# Patient Record
Sex: FEMALE | Race: BLACK OR AFRICAN AMERICAN | Employment: PART TIME | URBAN - NONMETROPOLITAN AREA
[De-identification: names, ages, dates, MRNs, and addresses within clinical notes are randomized per-mention and may not be internally consistent; named-entity substitution may affect disease eponyms.]

---
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
## 2019-11-27 NOTE — TELEPHONE ENCOUNTER
FUTURE VISIT INFORMATION      FUTURE VISIT INFORMATION:    Date: 12/10/19    Time: 11 AM    Location: [facility]  REFERRAL INFORMATION:    Referring provider:  Self-Referred    Referring providers clinic:  NA    Reason for visit/diagnosis  Left Cauliflower Ear    RECORDS REQUESTED FROM:       Clinic name Comments Records Status Imaging Status   [facility] 4/28/17 - OV with Dr. Good (consult 2/8/17)  4/7/17 - OP Note EXCISE EXTENSIVE BILATERAL EARLOBE KELOIDS with Dr. Good   Care Everywhere

## 2019-12-10 ENCOUNTER — OFFICE VISIT (OUTPATIENT)
Dept: OTOLARYNGOLOGY | Facility: CLINIC | Age: 19
End: 2019-12-10
Payer: COMMERCIAL

## 2019-12-10 ENCOUNTER — PRE VISIT (OUTPATIENT)
Dept: OTOLARYNGOLOGY | Facility: CLINIC | Age: 19
End: 2019-12-10

## 2019-12-10 VITALS
BODY MASS INDEX: 40.04 KG/M2 | SYSTOLIC BLOOD PRESSURE: 113 MMHG | WEIGHT: 226 LBS | DIASTOLIC BLOOD PRESSURE: 80 MMHG | HEART RATE: 78 BPM | HEIGHT: 63 IN | RESPIRATION RATE: 16 BRPM

## 2019-12-10 DIAGNOSIS — L91.0 KELOID SCAR: Primary | ICD-10-CM

## 2019-12-10 PROBLEM — F41.8 DEPRESSION WITH ANXIETY: Status: ACTIVE | Noted: 2019-12-10

## 2019-12-10 RX ORDER — TRIAMCINOLONE ACETONIDE 40 MG/ML
40 INJECTION, SUSPENSION INTRA-ARTICULAR; INTRAMUSCULAR ONCE
Status: COMPLETED | OUTPATIENT
Start: 2019-12-10 | End: 2019-12-10

## 2019-12-10 RX ADMIN — TRIAMCINOLONE ACETONIDE 40 MG: 40 INJECTION, SUSPENSION INTRA-ARTICULAR; INTRAMUSCULAR at 11:11

## 2019-12-10 ASSESSMENT — MIFFLIN-ST. JEOR: SCORE: 1757.87

## 2019-12-10 ASSESSMENT — PAIN SCALES - GENERAL: PAINLEVEL: MODERATE PAIN (4)

## 2019-12-10 NOTE — NURSING NOTE
"Chief Complaint   Patient presents with     Consult     left external and internal ear pain      Blood pressure 113/80, pulse 78, resp. rate 16, height 1.59 m (5' 2.6\"), weight 102.5 kg (226 lb).    Agus Shirley LPN    "

## 2019-12-10 NOTE — PATIENT INSTRUCTIONS
Charlotte Estrada,    It was a pleasure to see you today.    1. You were seen in the ENT Clinic today by Shahida Tello PA-C.  If you have any questions or concerns after your appointment, please call   - Option 1: ENT Clinic: [phone]      2. I did an injection in the right ear lobe keloid today.  Dr. Guerra will see if this has helped shrink the keloid.    3.  Please see Dr. Guerra for the left keloid to discuss surgery to remove it.        Thank you,  Shahida Tello PA-C  Otolaryngology  Head & Neck Surgery  [phone]

## 2019-12-10 NOTE — PROGRESS NOTES
Invasive Procedure Safety Checklist  Procedure:  Kenalog injection to right ear lobe keloid    Responsible person(s):  Complete sections as appropriate and electronically sign and date below.    Staff/Provider  Consent documentation on chart:  YES  H&P is not applicable (when straight local anesthesia is used).    Procedure Team  Completed by comparing informed consent documentation, information on the patient record and/or the marked surgical site, and discussion with the patient/guardian.     Verified:  (Select all that apply)  Patient identification (two indicators)  Procedure to be performed  Procedure site and /or laterality and/or level  Consent  Procedure site:  Site marking not requred.  Provider Singleton - Site/Laterality/Level:  Right  Staff/Provider:  No images    Procedure Team:  *Pause for the Cause* verbal and active participation of team members- verify:  Patient name:  YES  Procedure to be performed:  YES  Site, laterality and level, noting patient position:  YES    Above steps completed as applicable (Electronic Signature, Title, Date):    Shahida Tello PA-C, 12/10/2019, 11:08 AM        Note:  Any incidents of wrong patient, wrong procedure, or wrong site are reported using the Occurrence Process already in place.  The occurrence form is required to be completed immediately with this type of event.

## 2019-12-10 NOTE — LETTER
12/10/2019       RE: Charlotte Estrada  [address]  [place] [address]     Dear Colleague,    Thank you for referring your patient, Charlotte Estrada, to the [facility] EAR NOSE AND THROAT at [facility]. Please see a copy of my visit note below.    [facility] New Patient Visit Note        Otolaryngology        December 10, 2019    Referring Provider:  Self         HPI:  Charlotte Estrada is a 19 year old female who presents for evaluation of bilateral earlobe keloids.    The patient reports that she developed keloids several years ago, after having her ears pierced.  She finally went in and saw plastic surgeon at [facility], Dr. David Good, in 2017 for this.  On April 7, 2017 he did bilateral earlobe excisions for keloids.  The patient was supposed to follow-up for Kenalog injections, however, she says she was in foster care at that time and was never taken back in for those injections.      She states that she had about 4 months where the keloids were gone and then they slowly started to grow back.  The right 1 is very small but it is getting larger and the left one is quite big.  This 1 is somewhat uncomfortable at times.  She also noticed that intermittently she is feeling some left ear ringing and some 1 to 2-second episodes of sharp pain.  She has not had any drainage from either ear.  There is no facial numbness or tingling.  No hearing changes.  No other masses on the neck.    ROS:  10 point review of systems completed and is negative except for those listed above    PMH:   Past Medical History:   Diagnosis Date     Nonpsychotic mental disorder     2014,SI       PSH:   Past Surgical History:   Procedure Laterality Date     keloid removal bilateral ear lobes  04/07/2017    [facility], Dr. David Good     OTHER SURGICAL HISTORY      [id number],NO PREVIOUS SURGERY       FamH: No family history on  "file.    SocH:   Social History     Tobacco Use     Smoking status: Current Some Day Smoker     Packs/day: 0.00     Years: 0.00     Pack years: 0.00     Types: Cigarettes, Other     Start date: 2016     Last attempt to quit: 2018     Years since quittin.5     Smokeless tobacco: Current User   Substance Use Topics     Alcohol use: Never     Drug use: Never     Types: Other     Comment: Drug use: No   patient does vape 1-2 times a month      Medications:   Current Outpatient Medications   Medication     FLUoxetine (PROZAC) 40 MG capsule     No current facility-administered medications for this visit.        Allergies:     Allergies   Allergen Reactions     Bees          Physical Exam:   /80   Pulse 78   Resp 16   Ht 1.59 m (5' 2.6\")   Wt 102.5 kg (226 lb)   BMI 40.55 kg/m       Constitutional: The patient was unaccompanied, well-groomed, and in no acute distress.    Head: Normocephalic and atraumatic.   Eyes: Pupils were equal and reactive.  Extraocular movement intact.    Ears: Pinnae and tragus non-tender.  Otologic microscopic ear exam:  Right: EAC clear, TM clear.  No evidence of retraction, effusion, perforation or cholesteatoma.  5 mm x 7 mm keloid in the posterior ear lobe  Left: EAC clear, TM clear.  No evidence of retraction, effusion, perforation or cholesteatoma. 2 cm x 2 cm keloid in the posterior ear lobe  Neck: No lymphadenopathy in the neck.  No palpable thyroid.  Normal range of motion  Skin: Normal:  warm and pink without rash, see description of keloids above  Neurologic: Alert and oriented x 3.  CN's III-XII within normal limits.  Voice normal.   Psychiatric: The patient's affect was calm, cooperative, and appropriate.          Media Information      Document Information     Photograph   Right ear lobe Keloid   12/10/2019 10:41 AM   Attached To:   Office Visit on 12/10/19 with Shahida Tello PA-C   Source Information     Shahida Tello PA-C   [facility] "             Media Information       Document Information     Photograph   Left ear lobe keloid   12/10/2019 10:42 AM   Attached To:   Office Visit on 12/10/19 with Shahida Tello PA-C   Source Information     Shahida Tello PA-C   [facility]             PROCEDURE:   KENALOG STEROID INJECTION: Informed consent was obtained. The keloid in the right ear lobe was injected with Kenalog 40 mg/mL. A 25G needle was used to inject approximately 0.5 ml of the Kenalog 40 mg/mL into the keloid. Patient tolerated procedure well and only mentioned mild amount of pain during injection. Bleeding from injection site minimal and direct pressure was applied for hemostasis.        Assessment/Plan:     1. Keloid to bilateral ear lobes, L>R.  Patient with bilateral keloids in her earlobes.  Today, we discussed that the left one will likely need to be surgically excised and I do not think that Kenalog injections will be helpful given its size.  I will refer her to Dr. Rivera Spence to discuss surgical excision.  I also explained that Dr. Rivera Spence would likely recommend some follow-up Kenalog injections that we would want her to make sure she keeps these appointments for.  I do not think that piercing her ear in the future would be a good idea as she will likely have the same problems.  She is also very likely to have the keloids recur despite surgical excision and Kenalog injections.  She still wants to proceed with meeting with the surgeon.    For the right keloid, we did go ahead and do a Kenalog injection today.  Please see procedure note above.  She will still follow-up with Dr. Rivera Spence to discuss excision and repeat evaluation for that.        Shahida Tello PA-C  Otolaryngology  Head & Neck Surgery  [phone]      CC:  Mica Guerra MD  [facility]        David Good MD  [facility]      Invasive Procedure Safety Checklist  Procedure:  Kenalog injection to right ear  lobe keloid    Responsible person(s):  Complete sections as appropriate and electronically sign and date below.    Staff/Provider  Consent documentation on chart:  YES  H&P is not applicable (when straight local anesthesia is used).    Procedure Team  Completed by comparing informed consent documentation, information on the patient record and/or the marked surgical site, and discussion with the patient/guardian.     Verified:  (Select all that apply)  Patient identification (two indicators)  Procedure to be performed  Procedure site and /or laterality and/or level  Consent  Procedure site:  Site marking not requred.  Provider Singleton - Site/Laterality/Level:  Right  Staff/Provider:  No images    Procedure Team:  *Pause for the Cause* verbal and active participation of team members- verify:  Patient name:  YES  Procedure to be performed:  YES  Site, laterality and level, noting patient position:  YES    Above steps completed as applicable (Electronic Signature, Title, Date):    Shahida Tello PA-C, 12/10/2019, 11:08 AM        Note:  Any incidents of wrong patient, wrong procedure, or wrong site are reported using the Occurrence Process already in place.  The occurrence form is required to be completed immediately with this type of event.      Again, thank you for allowing me to participate in the care of your patient.      Sincerely,    Shahida Tello PA-C

## 2019-12-10 NOTE — PROGRESS NOTES
[facility] New Patient Visit Note        Otolaryngology        December 10, 2019    Referring Provider:  Self         HPI:  Charlotte Estrada is a 19 year old female who presents for evaluation of bilateral earlobe keloids.    The patient reports that she developed keloids several years ago, after having her ears pierced.  She finally went in and saw plastic surgeon at [facility] clinic, Dr. David Good, in  for this.  On 2017 he did bilateral earlobe excisions for keloids.  The patient was supposed to follow-up for Kenalog injections, however, she says she was in foster care at that time and was never taken back in for those injections.      She states that she had about 4 months where the keloids were gone and then they slowly started to grow back.  The right 1 is very small but it is getting larger and the left one is quite big.  This 1 is somewhat uncomfortable at times.  She also noticed that intermittently she is feeling some left ear ringing and some 1 to 2-second episodes of sharp pain.  She has not had any drainage from either ear.  There is no facial numbness or tingling.  No hearing changes.  No other masses on the neck.    ROS:  10 point review of systems completed and is negative except for those listed above    PMH:   Past Medical History:   Diagnosis Date     Nonpsychotic mental disorder     ,       PSH:   Past Surgical History:   Procedure Laterality Date     keloid removal bilateral ear lobes  2017    [facility], Dr. David Good     OTHER SURGICAL HISTORY      [id number],NO PREVIOUS SURGERY       FamH: No family history on file.    SocH:   Social History     Tobacco Use     Smoking status: Current Some Day Smoker     Packs/day: 0.00     Years: 0.00     Pack years: 0.00     Types: Cigarettes, Other     Start date: 2016     Last attempt to quit: 2018     Years since quittin.5     Smokeless tobacco: Current  "User   Substance Use Topics     Alcohol use: Never     Drug use: Never     Types: Other     Comment: Drug use: No   patient does vape 1-2 times a month      Medications:   Current Outpatient Medications   Medication     FLUoxetine (PROZAC) 40 MG capsule     No current facility-administered medications for this visit.        Allergies:     Allergies   Allergen Reactions     Bees          Physical Exam:   /80   Pulse 78   Resp 16   Ht 1.59 m (5' 2.6\")   Wt 102.5 kg (226 lb)   BMI 40.55 kg/m      Constitutional: The patient was unaccompanied, well-groomed, and in no acute distress.    Head: Normocephalic and atraumatic.   Eyes: Pupils were equal and reactive.  Extraocular movement intact.    Ears: Pinnae and tragus non-tender.  Otologic microscopic ear exam:  Right: EAC clear, TM clear.  No evidence of retraction, effusion, perforation or cholesteatoma.  5 mm x 7 mm keloid in the posterior ear lobe  Left: EAC clear, TM clear.  No evidence of retraction, effusion, perforation or cholesteatoma. 2 cm x 2 cm keloid in the posterior ear lobe  Neck: No lymphadenopathy in the neck.  No palpable thyroid.  Normal range of motion  Skin: Normal:  warm and pink without rash, see description of keloids above  Neurologic: Alert and oriented x 3.  CN's III-XII within normal limits.  Voice normal.   Psychiatric: The patient's affect was calm, cooperative, and appropriate.          Media Information      Document Information     Photograph   Right ear lobe Keloid   12/10/2019 10:41 AM   Attached To:   Office Visit on 12/10/19 with Shahida Tello PA-C   Source Information     Shahida Tello PA-C   [facility]             Media Information       Document Information     Photograph   Left ear lobe keloid   12/10/2019 10:42 AM   Attached To:   Office Visit on 12/10/19 with Shahida Tello PA-C   Source Information     Shahida Tello PA-C [facility]             PROCEDURE:   KENALOG STEROID INJECTION: " Informed consent was obtained. The keloid in the right ear lobe was injected with Kenalog 40 mg/mL. A 25G needle was used to inject approximately 0.5 ml of the Kenalog 40 mg/mL into the keloid. Patient tolerated procedure well and only mentioned mild amount of pain during injection. Bleeding from injection site minimal and direct pressure was applied for hemostasis.        Assessment/Plan:     1. Keloid to bilateral ear lobes, L>R.  Patient with bilateral keloids in her earlobes.  Today, we discussed that the left one will likely need to be surgically excised and I do not think that Kenalog injections will be helpful given its size.  I will refer her to Dr. Rivera Spence to discuss surgical excision.  I also explained that Dr. Rivera Spence would likely recommend some follow-up Kenalog injections that we would want her to make sure she keeps these appointments for.  I do not think that piercing her ear in the future would be a good idea as she will likely have the same problems.  She is also very likely to have the keloids recur despite surgical excision and Kenalog injections.  She still wants to proceed with meeting with the surgeon.    For the right keloid, we did go ahead and do a Kenalog injection today.  Please see procedure note above.  She will still follow-up with Dr. Rivera Spence to discuss excision and repeat evaluation for that.        Shahida Tello PA-C  Otolaryngology  Head & Neck Surgery  [phone]      CC:  Mica Guerra MD  [facility] [address]      David Good MD  [facility]

## 2020-01-22 ENCOUNTER — OFFICE VISIT (OUTPATIENT)
Dept: OTOLARYNGOLOGY | Facility: CLINIC | Age: 20
End: 2020-01-22
Payer: COMMERCIAL

## 2020-01-22 VITALS — BODY MASS INDEX: 39.87 KG/M2 | WEIGHT: 225 LBS | HEIGHT: 63 IN

## 2020-01-22 DIAGNOSIS — L91.0 KELOID SCAR: Primary | ICD-10-CM

## 2020-01-22 RX ORDER — CEFAZOLIN SODIUM 1 G/50ML
1 INJECTION, SOLUTION INTRAVENOUS SEE ADMIN INSTRUCTIONS
Status: CANCELLED | OUTPATIENT
Start: 2020-01-22

## 2020-01-22 RX ORDER — CEFAZOLIN SODIUM 2 G/50ML
2 SOLUTION INTRAVENOUS
Status: CANCELLED | OUTPATIENT
Start: 2020-01-22

## 2020-01-22 ASSESSMENT — PAIN SCALES - GENERAL: PAINLEVEL: MILD PAIN (2)

## 2020-01-22 ASSESSMENT — MIFFLIN-ST. JEOR: SCORE: 1751.78

## 2020-01-22 NOTE — TELEPHONE ENCOUNTER
FUTURE VISIT INFORMATION      SURGERY INFORMATION:    Date: 2/6/20    Location: [facility] OR    Surgeon:  Mica Guerra    Anesthesia Type:  MAC with Block    Procedure: EXCISION, LESION, EXTERNAL EAR    Consult: OV 1/22/20 Rivera [facility]- ent    RECORDS REQUESTED FROM:       Primary Care Provider: Mohinder

## 2020-01-22 NOTE — PROGRESS NOTES
.  Facial Plastic and Reconstructive Surgery Consultation    Referring Provider:  Shahida BRIGHT    HPI:   I had the pleasure of seeing Charlotte Estrada today in clinic for consultation for external ear hypertrophic scar.    Charlotte Estrada is a 19 year old female with a history of hypertrophic scar formation in bilateral external ears after piercing.  She had right external ear hypertrophic scar excised in the past however had recurrence due to the fact that she did not follow-up with steroid injections.  She notes she then returned to having more treatment and feels like that improved however on the left lobule she continues to have a very large keloid that is quite bothersome.    She would like to have these addressed.        Review Of Systems  ROS: 10 point ROS neg other than the symptoms noted above in the HPI.    Patient Active Problem List   Diagnosis     Depression with anxiety     Past Surgical History:   Procedure Laterality Date     keloid removal bilateral ear lobes  04/07/2017    [facility], Dr. David Good     OTHER SURGICAL HISTORY      [id number],NO PREVIOUS SURGERY     Current Outpatient Medications   Medication Sig Dispense Refill     FLUoxetine (PROZAC) 40 MG capsule Take 40 mg by mouth every morning       Bees  Social History     Socioeconomic History     Marital status: Single     Spouse name: Not on file     Number of children: Not on file     Years of education: Not on file     Highest education level: Not on file   Occupational History     Not on file   Social Needs     Financial resource strain: Not on file     Food insecurity:     Worry: Not on file     Inability: Not on file     Transportation needs:     Medical: Not on file     Non-medical: Not on file   Tobacco Use     Smoking status: Current Some Day Smoker     Packs/day: 0.00     Years: 0.00     Pack years: 0.00     Types: Cigarettes, Other     Start date: 4/25/2016     Last attempt to quit: 5/31/2018     Years since  quittin.6     Smokeless tobacco: Current User   Substance and Sexual Activity     Alcohol use: Never     Drug use: Never     Types: Other     Comment: Drug use: No     Sexual activity: Yes     Partners: Male     Birth control/protection: None   Lifestyle     Physical activity:     Days per week: Not on file     Minutes per session: Not on file     Stress: Not on file   Relationships     Social connections:     Talks on phone: Not on file     Gets together: Not on file     Attends [employer] service: Not on file     Active member of club or organization: Not on file     Attends meetings of clubs or organizations: Not on file     Relationship status: Not on file     Intimate partner violence:     Fear of current or ex partner: Not on file     Emotionally abused: Not on file     Physically abused: Not on file     Forced sexual activity: Not on file   Other Topics Concern     Not on file   Social History Narrative    2016 Admitted to [facility] for 35 day evaluation 2016.   Mother Trini GARCIA [facility].    SALLY NASCIMENTO NP ....................  2016   1:40 PM     No family history on file.    PE:  Alert and Oriented, Answering Questions Appropriately  Atraumatic, Normocephalic, Face Symmetric  Skin: Fuentes 6  Facial Nerve Intact and facial movement symmetric  EOM's, PEERL  Ear exam on the right with intact external ear with no evidence of any obvious keloid formation left examination demonstrates a 4 cm in diameter keloid off of the posterior aspect of the lobule.  This with excision would leave about approximately a nickel size defect of full-thickness skin and thus would require advancement flaps for closure.  Nasal Exam: No external Deformity, no mucopurulence or polyps, no masses  Chin: Normal   Lips/Teeth/Toungue/Gums: Lips intact, Normal Dentition, Occlusion intact, Oral mucosa intact, no lesions/ulcerations/masses, Tongue mobile  Neck: No lymphadenopathy, no  thyromegaly, trachea midline  Chest: No wheezing, cyanosis, or stridor  Card: Normal upper extremity pulses and capillary refill, not diaphoretic  Neuro/Psych: CN's 2-12 intact, Moves all extremities, ambulation in intact, positive affect, no notable muscle weakness            IMPRESSION:    The encounter diagnosis was Keloid scar.        PLAN:    Charlotte Estrada presents today with a significant sized keloid in the lobule the left ear that is quite bothersome.  This is well beyond the size that can be treated medically with injections.  We would have to excise perform local advancement flaps for closure of the lobule and reconstruction and subsequently have her follow-up with Kenalog injections.  I discussed this with the patient today.  I offered doing this under local in the procedure room however she stated that she is quite intolerant to any injections and would be quite bothered by it.  She would like to have this done under sedation.         Photodocumentation was obtained.     I spent a total of 30 minutes face-to-face with Charlotte Estrada during today's office visit.  Over 50% of this time was spent counseling the patient and/or coordinating care regarding keloid excision and ear lobe reconstruction .  See note for details.

## 2020-01-22 NOTE — LETTER
1/22/2020       RE: Charlotte Estrada  [address]  [place] [address]     Dear Colleague,    Thank you for referring your patient, Charlotte Estrada, to the [facility] EAR NOSE AND THROAT at [facility]. Please see a copy of my visit note below.    .  Facial Plastic and Reconstructive Surgery Consultation    Referring Provider:  Shahida BRIGHT    HPI:   I had the pleasure of seeing Charlotte Estrada today in clinic for consultation for external ear hypertrophic scar.    Charlotte Estrada is a 19 year old female with a history of hypertrophic scar formation in bilateral external ears after piercing.  She had right external ear hypertrophic scar excised in the past however had recurrence due to the fact that she did not follow-up with steroid injections.  She notes she then returned to having more treatment and feels like that improved however on the left lobule she continues to have a very large keloid that is quite bothersome.    She would like to have these addressed.        Review Of Systems  ROS: 10 point ROS neg other than the symptoms noted above in the HPI.    Patient Active Problem List   Diagnosis     Depression with anxiety     Past Surgical History:   Procedure Laterality Date     keloid removal bilateral ear lobes  04/07/2017    [facility] Surgery, Dr. David Good     OTHER SURGICAL HISTORY      [id number],NO PREVIOUS SURGERY     Current Outpatient Medications   Medication Sig Dispense Refill     FLUoxetine (PROZAC) 40 MG capsule Take 40 mg by mouth every morning       Bees  Social History     Socioeconomic History     Marital status: Single     Spouse name: Not on file     Number of children: Not on file     Years of education: Not on file     Highest education level: Not on file   Occupational History     Not on file   Social Needs     Financial resource strain: Not on file     Food insecurity:     Worry: Not on file     Inability: Not on file      Transportation needs:     Medical: Not on file     Non-medical: Not on file   Tobacco Use     Smoking status: Current Some Day Smoker     Packs/day: 0.00     Years: 0.00     Pack years: 0.00     Types: Cigarettes, Other     Start date: 2016     Last attempt to quit: 2018     Years since quittin.6     Smokeless tobacco: Current User   Substance and Sexual Activity     Alcohol use: Never     Drug use: Never     Types: Other     Comment: Drug use: No     Sexual activity: Yes     Partners: Male     Birth control/protection: None   Lifestyle     Physical activity:     Days per week: Not on file     Minutes per session: Not on file     Stress: Not on file   Relationships     Social connections:     Talks on phone: Not on file     Gets together: Not on file     Attends Christian service: Not on file     Active member of club or organization: Not on file     Attends meetings of clubs or organizations: Not on file     Relationship status: Not on file     Intimate partner violence:     Fear of current or ex partner: Not on file     Emotionally abused: Not on file     Physically abused: Not on file     Forced sexual activity: Not on file   Other Topics Concern     Not on file   Social History Narrative    2016 Admitted to [facility] for 35 day evaluation 2016.   Mother Trini Estrada  [facility].    SALLY NASCIMENTO NP ....................  2016   1:40 PM     No family history on file.    PE:  Alert and Oriented, Answering Questions Appropriately  Atraumatic, Normocephalic, Face Symmetric  Skin: Fuentes 6  Facial Nerve Intact and facial movement symmetric  EOM's, PEERL  Ear exam on the right with intact external ear with no evidence of any obvious keloid formation left examination demonstrates a 4 cm in diameter keloid off of the posterior aspect of the lobule.  This with excision would leave about approximately a nickel size defect of full-thickness skin and thus would  require advancement flaps for closure.  Nasal Exam: No external Deformity, no mucopurulence or polyps, no masses  Chin: Normal   Lips/Teeth/Toungue/Gums: Lips intact, Normal Dentition, Occlusion intact, Oral mucosa intact, no lesions/ulcerations/masses, Tongue mobile  Neck: No lymphadenopathy, no thyromegaly, trachea midline  Chest: No wheezing, cyanosis, or stridor  Card: Normal upper extremity pulses and capillary refill, not diaphoretic  Neuro/Psych: CN's 2-12 intact, Moves all extremities, ambulation in intact, positive affect, no notable muscle weakness            IMPRESSION:    The encounter diagnosis was Keloid scar.        PLAN:    Charlotte Estrada presents today with a significant sized keloid in the lobule the left ear that is quite bothersome.  This is well beyond the size that can be treated medically with injections.  We would have to excise perform local advancement flaps for closure of the lobule and reconstruction and subsequently have her follow-up with Kenalog injections.  I discussed this with the patient today.  I offered doing this under local in the procedure room however she stated that she is quite intolerant to any injections and would be quite bothered by it.  She would like to have this done under sedation.         Photodocumentation was obtained.     I spent a total of 30 minutes face-to-face with Charlotte Estrada during today's office visit.  Over 50% of this time was spent counseling the patient and/or coordinating care regarding keloid excision and ear lobe reconstruction .  See note for details.      Again, thank you for allowing me to participate in the care of your patient.      Sincerely,    Mica Guerra MD

## 2020-01-22 NOTE — NURSING NOTE
"Chief Complaint   Patient presents with     Consult     Discuss surgery for left keloid     Height 1.588 m (5' 2.5\"), weight 102.1 kg (225 lb).    Jaelyn Aguirre, EMT  "

## 2020-01-22 NOTE — NURSING NOTE
Photodocumentation obtained.    Will work to schedule removal of Left Earlobe Keloid and closure with Local Advancement Flaps.    The plan is for pt to follow up with Dr. Guerra post-op for serial Kenalog injections.    Leyla Lawton RN  1/22/2020 4:56 PM

## 2020-01-23 ENCOUNTER — TELEPHONE (OUTPATIENT)
Dept: OTOLARYNGOLOGY | Facility: CLINIC | Age: 20
End: 2020-01-23

## 2020-01-24 ENCOUNTER — ANESTHESIA EVENT (OUTPATIENT)
Dept: SURGERY | Facility: AMBULATORY SURGERY CENTER | Age: 20
End: 2020-01-24

## 2020-01-24 ENCOUNTER — OFFICE VISIT (OUTPATIENT)
Dept: SURGERY | Facility: CLINIC | Age: 20
End: 2020-01-24
Payer: COMMERCIAL

## 2020-01-24 ENCOUNTER — PRE VISIT (OUTPATIENT)
Dept: SURGERY | Facility: CLINIC | Age: 20
End: 2020-01-24

## 2020-01-24 VITALS
DIASTOLIC BLOOD PRESSURE: 76 MMHG | HEART RATE: 86 BPM | TEMPERATURE: 98.4 F | SYSTOLIC BLOOD PRESSURE: 113 MMHG | HEIGHT: 64 IN | RESPIRATION RATE: 14 BRPM | OXYGEN SATURATION: 100 % | BODY MASS INDEX: 38.99 KG/M2 | WEIGHT: 228.4 LBS

## 2020-01-24 DIAGNOSIS — Z01.818 PRE-OP EXAMINATION: Primary | ICD-10-CM

## 2020-01-24 ASSESSMENT — ENCOUNTER SYMPTOMS: SEIZURES: 0

## 2020-01-24 ASSESSMENT — LIFESTYLE VARIABLES: TOBACCO_USE: 1

## 2020-01-24 ASSESSMENT — MIFFLIN-ST. JEOR: SCORE: 1791.02

## 2020-01-24 ASSESSMENT — PAIN SCALES - GENERAL: PAINLEVEL: MODERATE PAIN (4)

## 2020-01-24 NOTE — H&P
Pre-Operative H & P     CC:  Preoperative exam to assess for increased cardiopulmonary risk while undergoing surgery and anesthesia.    Date of Encounter: 1/24/2020  Primary Care Physician:  Gwen Orourke     Reason for visit: pre operative examination, Keloid scar    HPI  Charlotte Estrada is a 19 year old female who presents for pre-operative H & P in preparation for EXCISION, LESION, EXTERNAL EAR with Dr. Guerra on 2/6/20 at [facility].     The patient is a 19 year old woman who has a history of keloid scars from previous ear piercing. She had a bilateral keloid excision on 4/7/17. She was lost to follow up and the keloids returned. Sh has been following with ENT for kenalog injections to her right ear but she continues to have a large keloid on the left ear. She met with Dr. Guerra on 1/22/20 to discuss her surgical options as she is bothered by the keloid. She is now scheduled for the procedure as above.     History is obtained from the patient and chart review    Past Medical History  Past Medical History:   Diagnosis Date     Depression with anxiety      Keloid      Obesity      Smoking        Past Surgical History  Past Surgical History:   Procedure Laterality Date     keloid removal bilateral ear lobes  04/07/2017    [facility]astic Surgery, Dr. David Good       Hx of Blood transfusions/reactions: denies     Hx of abnormal bleeding or anti-platelet use: none    Menstrual history: Patient's last menstrual period was 01/24/2020.:  Period started on 1/17/20    Steroid use in the last year: none    Personal or FH with difficulty with Anesthesia:  denies    Prior to Admission Medications  No current outpatient medications on file.       Allergies  Allergies   Allergen Reactions     Bees        Social History  Social History     Socioeconomic History     Marital status: Single     Spouse name: Not on file     Number of children: Not on file     Years of education: Not  on file     Highest education level: Not on file   Occupational History     Not on file   Social Needs     Financial resource strain: Not on file     Food insecurity:     Worry: Not on file     Inability: Not on file     Transportation needs:     Medical: Not on file     Non-medical: Not on file   Tobacco Use     Smoking status: Former Smoker     Packs/day: 0.00     Years: 0.00     Pack years: 0.00     Types: Vaping Device     Start date: 4/25/2016     Smokeless tobacco: Never Used     Tobacco comment: E-cig used couple times a month, haven't used in 2 months    Substance and Sexual Activity     Alcohol use: Never     Drug use: Never     Types: Other     Comment: Drug use: No     Sexual activity: Yes     Partners: Male     Birth control/protection: None   Lifestyle     Physical activity:     Days per week: Not on file     Minutes per session: Not on file     Stress: Not on file   Relationships     Social connections:     Talks on phone: Not on file     Gets together: Not on file     Attends Orthodox service: Not on file     Active member of club or organization: Not on file     Attends meetings of clubs or organizations: Not on file     Relationship status: Not on file     Intimate partner violence:     Fear of current or ex partner: Not on file     Emotionally abused: Not on file     Physically abused: Not on file     Forced sexual activity: Not on file   Other Topics Concern     Not on file   Social History Narrative    9/7/2016 Admitted to [facility] for 35 day evaluation 8/31/2016.   Mother Trini Estrada  [facility].    SALLY NASCIMENTO NP ....................  9/7/2016   1:40 PM       Family History  History reviewed. No pertinent family history.    Review of Systems      ROS/MED HX    ENT/Pulmonary:     (+)JUANIS risk factors obese, tobacco use (patient uses an E-cig a couple of times a month. It's been > 2 months since last used), Current use , recent URI resolved Cough - resolving : . .   "  Neurologic:  - neg neurologic ROS    (-) seizures, CVA and migraines   Cardiovascular:  - neg cardiovascular ROS   (+) ----. : . . . :. . No previous cardiac testing       METS/Exercise Tolerance:  4 - Raking leaves, gardening   Hematologic:  - neg hematologic  ROS      (-) history of blood clots, anemia and History of Transfusion   Musculoskeletal:  - neg musculoskeletal ROS       GI/Hepatic:  - neg GI/hepatic ROS      (-) GERD   Renal/Genitourinary:  - ROS Renal section negative       Endo:     (+) Obesity, .      Psychiatric:     (+) psychiatric history anxiety, depression and other (comment) (PTSD)      Infectious Disease:  - neg infectious disease ROS       Malignancy:      - no malignancy   Other: Comment: Keloid    (+) Possibly pregnant LMP: 1/17/20, no H/O Chronic Pain,no other significant disability          The complete review of systems is negative other than noted in the HPI or here.   Temp: 98.4  F (36.9  C) Temp src: Oral BP: 113/76 Pulse: 86   Resp: 14 SpO2: 100 %         228 lbs 6.4 oz  5' 4\"   Body mass index is 39.2 kg/m .       Physical Exam  Constitutional: Awake, alert, cooperative, no apparent distress, and appears stated age.  Eyes: Pupils equal, round and reactive to light, extra ocular muscles intact, sclera clear, conjunctiva normal.  HENT: Normocephalic, oral pharynx with moist mucus membranes, good dentition. No goiter appreciated. Left ear keloid   Respiratory: Clear to auscultation bilaterally, no crackles or wheezing.  Cardiovascular: Regular rate and rhythm, normal S1 and S2, and no murmur noted.  Carotids +2, no bruits. No edema. Palpable pulses to radial  DP and PT arteries.   GI: Normal bowel sounds, soft, non-distended, non-tender, obese, exam limited secondary to body habitus.   Lymph/Hematologic: No cervical lymphadenopathy and no supraclavicular lymphadenopathy.  Genitourinary:  defer  Skin: Warm and dry.  No rashes at anticipated surgical site.   Musculoskeletal: Full ROM of " neck. There is no redness, warmth, or swelling of the joints. Gross motor strength is normal.    Neurologic: Awake, alert, oriented to name, place and time. Cranial nerves II-XII are grossly intact. Gait is normal.   Neuropsychiatric: Calm, cooperative. Normal affect.     Labs: (personally reviewed)  COMPLETE BLOOD COUNT (NO DIFF) (07/06/2019 11:56 PM CDT)  COMPLETE BLOOD COUNT (NO DIFF) (07/06/2019 11:56 PM CDT)   Component Value Ref Range Performed At Pathologist Signature   WBC Count, Corrected 5.1 3.7 - 12.1 10(3)/uL [facility]     RBC Count 4.41 3.76 - 5.39 10(6)/uL [facility]     Hemoglobin 12.3 11.2 - 15.8 g/dL [facility]     Hematocrit 37.7 33.9 - 47.5 % [facility]     MCV 85.4 80.6 - 98.5 fL [facility]     MCH 27.9 26.4 - 32.9 pg [facility]     MCHC 32.6 32.0 - 36.0 g/dL [facility]     RDW 13.8 10.0 - 16.8 % [facility]     Platelets 316 179 - 450 10(3)/uL [facility]     MPV 7.7 7.4 - 10.4 fL [facility]     COMPREHENSIVE METABOLIC PANEL (12/10/2018 10:37 AM CST)  COMPREHENSIVE METABOLIC PANEL (12/10/2018 10:37 AM CST)   Component Value Ref Range Performed At Pathologist Signature   Glucose 90 70 - 100 mg/dL [facility] [place]     Blood Urea Nitrogen 12.2 10.0 - 20.0 mg/dL [facility] [place]     Creatinine 0.81 0.57 - 1.11 mg/dL [facility] [place]     BUN/Creatinine Ratio 15.06 11.70 - 22.90 ratio [facility] [place]     Sodium 140 136 - 146 mmol/L [facility] [place]     Potassium 3.8 3.5 - 5.1 mmol/L [facility] [place]     Chloride 108 (H) 98 - 107 mmol/L [facility] [place]     CO2 23 22 - 29 mmol/L [facility] [place]     Calcium 10.2 8.6 - 10.5 mg/dL [facility] [place]     Total Protein 8.0 6.0 - 8.0 g/dL [facility] [place]     Albumin 4.4 3.5 - 5.0 g/dL [facility] [place]     Globulin 3.6 (H) 2.0 - 3.5 g/dL [facility] [place]     Albumin/Globulin Ratio 1.2 1.0 - 2.0 [facility] [place]     Aspartate Aminotransferase (AST) 19 5 - 41 U/L [facility] [place]     Alanine Aminotransferase (ALT) 15 8 - 45 U/L [facility] [place]     Alkaline Phosphatase 90 58 - 237 U/L [facility] [place]     Bilirubin, Total 0.6 0.2 - 1.2 mg/dL [facility] [place]     Anion Gap 12.8 10.0 - 20.0 mmol/L [facility] [place]     eGFR >60Comment: eGFR calculations have been updated from the MDRD equation to the CKD-EPI equation to better accommodate high eGFR results. >=60 mL/min/1.73m(2) [facility] [place]       EKG: not indicated.     The patient's records and results personally reviewed by this provider.     Outside records reviewed from: care everywhere    ASSESSMENT and PLAN  Charlotte is a 19 year old woman who is scheduled for excision, lesion, external ear on 2/6/20 by Dr. Guerra in treatment of keloid scar.  PAC referral for risk assessment and optimization for anesthesia with comorbid conditions of smoking, obesity, depression, anxiety:    Pre-operative considerations:  1.  Cardiac:  Functional status- METS 4, the patient worked at [employer] and walked all day long with bending and liftng.  Low risk surgery with 0.4% (RCRI #) risk of major adverse cardiac event. The patient denies any cardiac symptoms. She has no cardiac diagnosis and no previous testing. Per ACC/AHA 2014  guidelines no further testing indicated.     2.  Pulm:  Airway feasible.  JUANIS risk: low (BMI)  ~ Patient smokes E-cig a couple of times a month. It's been over 2 months since her last time smoking. We discussed smoking cessation especially the DOS and after.   ~ The patient has a resolving URI with a cough. She denies any fevers or chills. We discussed that she will monitor her symptoms and if worsening cough, fevers or chills then will need to alert surgical team.     3. Endo: Obesity, BMI 39 - consideration for safe lifting techniques.     4. GI:  Risk of PONV score = 2.  If > 2, anti-emetic intervention recommended.    5. Psych: depression/anxiety/PTSD - the patient has been on Prozac in the past but is not currently on any medications.     VTE risk: 0.26%    The patient is optimized for their procedure. AVS with information on surgery time/arrival time, meds and NPO status given by nursing staff.        Deann Stewart PA-C  Preoperative Assessment Center  [facility]  Clinic and Surgery Center  Phone: [phone]  Fax: [phone]

## 2020-01-24 NOTE — PATIENT INSTRUCTIONS
Preparing for Your Surgery      Name:  Charlotte Estrada   MRN:  [id number]   :  2000   Today's Date:  2020     Arriving for surgery:  Surgery date:  20  Arrival time:  1:30 am  Please come to:     [facility]  [address] [place] [id number]     Parking is available in front of the Clinics and Surgery Center building from 5:30AM to 8:00PM.  -  Proceed to the 5th floor to check into the Ambulatory Surgery Center.              >> There will be patient concierges on the 1st and 5th floor, for assistance or an escort, if you would like.              >> Please call [phone] with any questions.    What can I eat or drink?  -  You may have solid food or milk products until 8 hours prior to your surgery.  -  You may have water, apple juice or 7up/Sprite until 2 hours prior to your surgery.    Which medicines can I take?  Stop Aspirin, vitamins and supplements one week prior to surgery.  Hold Ibuprofen for 24 hours and/or Naproxen for 48 hours prior to surgery.   -  Please take these medications the day of surgery:  Tylenol if needed; take am medications normally taken in the morning.    How do I prepare myself?  -  Take two showers: one the night before surgery; and one the morning of surgery.         Use Scrubcare or Hibiclens to wash from neck down, leave soap on your skin for up to one minute.  Do not get soap in your eyes or ears.  You may use your own shampoo and conditioner; no other hair products.   -  Do NOT use lotion, powder, deodorant, or antiperspirant the day of your surgery.  -  Do NOT wear any makeup, fingernail polish or jewelry.  - Do not bring your own medications to the hospital, except for inhalers and eye drops.  -  Bring your ID and insurance card.    -If you are scheduled to go home the Same Day as surgery you must have a responsible adult as a  and to stay with you overnight the first 24 hours after surgery.     Questions or  Concerns:    -If you are scheduled at the Ambulatory Surgery Center and have questions or concerns regarding the day of surgery please call [phone].    -If you have health changes between today and your surgery please call your surgeon. For questions after surgery please call your surgeons office.

## 2020-01-24 NOTE — ANESTHESIA PREPROCEDURE EVALUATION
Anesthesia Pre-Procedure Evaluation    Patient: Charlotte Estrada   MRN:     [id number] Gender:   female   Age:    19 year old :      2000        Preoperative Diagnosis: Keloid scar [L91.0]   Procedure(s):  EXCISION, LESION, EXTERNAL EAR     Past Medical History:   Diagnosis Date     Depression with anxiety      Keloid      Obesity      Smoking       Past Surgical History:   Procedure Laterality Date     keloid removal bilateral ear lobes  2017    [facility], Dr. David Good     OTHER SURGICAL HISTORY      [id number],NO PREVIOUS SURGERY          Anesthesia Evaluation     . Pt has had prior anesthetic. Type: General    No history of anesthetic complications          ROS/MED HX    ENT/Pulmonary:     (+)JUANIS risk factors obese, tobacco use (patient uses an E-cig a couple of times a month. It's been > 2 months since last used), Current use , recent URI resolved Cough - resolving : . .    Neurologic:  - neg neurologic ROS    (-) seizures, CVA and migraines   Cardiovascular:  - neg cardiovascular ROS   (+) ----. : . . . :. . No previous cardiac testing       METS/Exercise Tolerance:  4 - Raking leaves, gardening   Hematologic:  - neg hematologic  ROS      (-) history of blood clots, anemia and History of Transfusion   Musculoskeletal:  - neg musculoskeletal ROS       GI/Hepatic:  - neg GI/hepatic ROS      (-) GERD   Renal/Genitourinary:  - ROS Renal section negative       Endo:     (+) Obesity, .      Psychiatric:     (+) psychiatric history anxiety, depression and other (comment) (PTSD)      Infectious Disease:  - neg infectious disease ROS       Malignancy:      - no malignancy   Other: Comment: Keloid    (+) Possibly pregnant LMP: 19, no H/O Chronic Pain,no other significant disability                        PHYSICAL EXAM:   Mental Status/Neuro: A/A/O; Age Appropriate   Airway: Facies: Feasible  Mallampati: I  Mouth/Opening: Full  TM distance: > 6 cm  Neck ROM: Full   Respiratory:  Auscultation: CTAB     Resp. Rate: Normal     Resp. Effort: Normal      CV: Rhythm: Regular  Heart: Normal Sounds  Edema: None  Pulses: Normal   Comments:                      COMPLETE BLOOD COUNT (NO DIFF) (07/06/2019 11:56 PM CDT)  COMPLETE BLOOD COUNT (NO DIFF) (07/06/2019 11:56 PM CDT)   Component Value Ref Range Performed At Pathologist Signature   WBC Count, Corrected 5.1 3.7 - 12.1 10(3)/uL [facility]     RBC Count 4.41 3.76 - 5.39 10(6)/[facility]     Hemoglobin 12.3 11.2 - 15.8 g/dL [facility]     Hematocrit 37.7 33.9 - 47.5 % [facility]     MCV 85.4 80.6 - 98.5 fL [facility]     MCH 27.9 26.4 - 32.9 pg [facility]     MCHC 32.6 32.0 - 36.0 g/dL [facility]     RDW 13.8 10.0 - 16.8 % [facility]     Platelets 316 179 - 450 10(3)/uL [facility]     MPV 7.7 7.4 - 10.4 fL [facility]     COMPREHENSIVE METABOLIC PANEL (12/10/2018 10:37 AM CST)  COMPREHENSIVE METABOLIC PANEL (12/10/2018 10:37 AM CST)   Component Value Ref Range Performed At Pathologist Signature   Glucose 90 70 - 100 mg/dL [facility] [place]     Blood Urea Nitrogen 12.2 10.0 - 20.0 mg/dL [facility] [place]     Creatinine 0.81 0.57 - 1.11 mg/dL [facility] [place]     BUN/Creatinine Ratio 15.06 11.70 - 22.90 ratio [facility] [place]     Sodium 140 136 - 146 mmol/L [facility] [place]     Potassium 3.8 3.5 - 5.1 mmol/L [facility] [place]     Chloride 108 (H) 98 - 107 mmol/L [facility] [place]     CO2 23 22 - 29 mmol/L  "[facility] [place]     Calcium 10.2 8.6 - 10.5 mg/dL [facility] [place]     Total Protein 8.0 6.0 - 8.0 g/dL [facility] [place]     Albumin 4.4 3.5 - 5.0 g/dL [facility] [place]     Globulin 3.6 (H) 2.0 - 3.5 g/dL [facility] [place]     Albumin/Globulin Ratio 1.2 1.0 - 2.0 [facility] [place]     Aspartate Aminotransferase (AST) 19 5 - 41 U/L [facility] [place]     Alanine Aminotransferase (ALT) 15 8 - 45 U/L [facility] [place]     Alkaline Phosphatase 90 58 - 237 U/L [facility] [place]     Bilirubin, Total 0.6 0.2 - 1.2 mg/dL [facility] [place]     Anion Gap 12.8 10.0 - 20.0 mmol/L [facility] [place]     eGFR >60Comment: eGFR calculations have been updated from the MDRD equation to the CKD-EPI equation to better accommodate high eGFR results. >=60 mL/min/1.73m(2) [facility] [place]       Preop Vitals    BP Readings from Last 3 Encounters:   12/10/19 113/80   09/07/16 108/62 (42 %/ 34 %)*     *BP percentiles are based on the 2017 AAP Clinical Practice Guideline for girls    Pulse Readings from Last 3 Encounters:   12/10/19 78   09/07/16 99      Resp Readings from Last 3 Encounters:   12/10/19 16   09/07/16 20    SpO2 Readings from Last 3 Encounters:   No data found for SpO2      Temp Readings from Last 1 Encounters:   09/07/16 97.6  F (36.4  C) (Tympanic)    Ht Readings from Last 1 Encounters:   01/22/20 1.588 m (5' 2.5\")      Wt Readings from Last 1 Encounters:   01/22/20 102.1 kg (225 lb)    Estimated body mass index is 40.5 kg/m  as calculated from the following:    Height as of 1/22/20: 1.588 m (5' 2.5\").    Weight as of 1/22/20: 102.1 kg (225 lb).     LDA:        Assessment:   ASA SCORE: 2    H&P: History and physical reviewed and following examination; no interval change. "   Smoking Status:  Non-Smoker/Unknown   NPO Status: NPO Appropriate     Plan:   Anes. Type:  MAC   Pre-Medication: Acetaminophen   Induction:  IV (Standard)   Airway: Native Airway   Access/Monitoring: PIV   Maintenance: N/a     Postop Plan:   Postop Pain: None  Postop Sedation/Airway: Not planned  Disposition: Outpatient     PONV Management:   Adult Risk Factors: Female, Non-Smoker   Prevention: Ondansetron, Propofol     CONSENT: Direct conversation   Plan and risks discussed with: Patient   Blood Products: Consent Deferred (Minimal Blood Loss)       Comments for Plan/Consent:  Local infiltration per surgeon              PAC Discussion and Assessment    ASA Classification: 2  Case is suitable for: ASC  Anesthetic techniques and relevant risks discussed: MAC with GA as backup  Invasive monitoring and risk discussed:   Types:   Possibility and Risk of blood transfusion discussed:   NPO instructions given:   Additional anesthetic preparation and risks discussed:   Needs early admission to pre-op area:   Other:     PAC Resident/NP Anesthesia Assessment:  Charlotte is a 19 year old woman who is scheduled for excision, lesion, external ear on 2/6/20 by Dr. Guerra in treatment of keloid scar.  PAC referral for risk assessment and optimization for anesthesia with comorbid conditions of smoking, obesity, depression, anxiety:    Pre-operative considerations:  1.  Cardiac:  Functional status- METS 4, the patient worked at [employer] and walked all day long with bending and liftng.  Low risk surgery with 0.4% (RCRI #) risk of major adverse cardiac event. The patient denies any cardiac symptoms. She has no cardiac diagnosis and no previous testing. Per ACC/AHA 2014 guidelines no further testing indicated.     2.  Pulm:  Airway feasible.  JUANIS risk: low (BMI)  ~ Patient smokes E-cig a couple of times a month. It's been over 2 months since her last time smoking. We discussed smoking cessation especially the DOS and after.   ~ The  patient has a resolving URI with a cough. She denies any fevers or chills. We discussed that she will monitor her symptoms and if worsening cough, fevers or chills then will need to alert surgical team.     3. Endo: Obesity, BMI 39 - consideration for safe lifting techniques.     4. GI:  Risk of PONV score = 2.  If > 2, anti-emetic intervention recommended.    5. Psych: depression/anxiety/PTSD - the patient has been on Prozac in the past but is not currently on any medications.     VTE risk: 0.26%    Patient is optimized and is acceptable candidate for the proposed procedure.  No further diagnostic evaluation is needed.     For further details of assessment, testing, and physical exam please see H and P completed on same date.    Deann Alex PA-C          Mid-Level Provider/Resident: Deann Stewart PA-C  Date: 1/24/20  Time:     Attending Anesthesiologist Anesthesia Assessment:        Anesthesiologist:   Date:   Time:   Pass/Fail:   Disposition:     PAC Pharmacist Assessment:        Pharmacist:   Date:   Time:    MARIO Buckley MD  Staff Anesthesiologist  [phone]

## 2020-02-06 ENCOUNTER — ANESTHESIA (OUTPATIENT)
Dept: SURGERY | Facility: AMBULATORY SURGERY CENTER | Age: 20
End: 2020-02-06

## 2020-02-06 ENCOUNTER — HOSPITAL ENCOUNTER (OUTPATIENT)
Facility: AMBULATORY SURGERY CENTER | Age: 20
End: 2020-02-06
Attending: OTOLARYNGOLOGY
Payer: COMMERCIAL

## 2020-02-06 VITALS
WEIGHT: 228 LBS | SYSTOLIC BLOOD PRESSURE: 123 MMHG | OXYGEN SATURATION: 100 % | TEMPERATURE: 97 F | HEIGHT: 64 IN | DIASTOLIC BLOOD PRESSURE: 83 MMHG | BODY MASS INDEX: 38.93 KG/M2 | RESPIRATION RATE: 16 BRPM | HEART RATE: 100 BPM

## 2020-02-06 DIAGNOSIS — L91.0 KELOID SCAR: ICD-10-CM

## 2020-02-06 LAB
HCG UR QL: NEGATIVE
HCG UR QL: NEGATIVE
INTERNAL QC OK POCT: YES
INTERNAL QC OK POCT: YES

## 2020-02-06 PROCEDURE — 88307 TISSUE EXAM BY PATHOLOGIST: CPT | Performed by: OTOLARYNGOLOGY

## 2020-02-06 RX ORDER — CEFAZOLIN SODIUM 1 G/50ML
1 SOLUTION INTRAVENOUS SEE ADMIN INSTRUCTIONS
Status: DISCONTINUED | OUTPATIENT
Start: 2020-02-06 | End: 2020-02-06 | Stop reason: HOSPADM

## 2020-02-06 RX ORDER — ONDANSETRON 2 MG/ML
INJECTION INTRAMUSCULAR; INTRAVENOUS PRN
Status: DISCONTINUED | OUTPATIENT
Start: 2020-02-06 | End: 2020-02-06

## 2020-02-06 RX ORDER — SODIUM CHLORIDE, SODIUM LACTATE, POTASSIUM CHLORIDE, CALCIUM CHLORIDE 600; 310; 30; 20 MG/100ML; MG/100ML; MG/100ML; MG/100ML
INJECTION, SOLUTION INTRAVENOUS CONTINUOUS
Status: DISCONTINUED | OUTPATIENT
Start: 2020-02-06 | End: 2020-02-06 | Stop reason: HOSPADM

## 2020-02-06 RX ORDER — TRIAMCINOLONE ACETONIDE 40 MG/ML
INJECTION, SUSPENSION INTRA-ARTICULAR; INTRAMUSCULAR PRN
Status: DISCONTINUED | OUTPATIENT
Start: 2020-02-06 | End: 2020-02-06 | Stop reason: HOSPADM

## 2020-02-06 RX ORDER — OXYCODONE HYDROCHLORIDE 5 MG/1
5 TABLET ORAL EVERY 4 HOURS PRN
Status: DISCONTINUED | OUTPATIENT
Start: 2020-02-06 | End: 2020-02-07 | Stop reason: HOSPADM

## 2020-02-06 RX ORDER — ACETAMINOPHEN 325 MG/1
650 TABLET ORAL EVERY 6 HOURS PRN
Qty: 50 TABLET | Refills: 0 | Status: SHIPPED | OUTPATIENT
Start: 2020-02-06

## 2020-02-06 RX ORDER — PROPOFOL 10 MG/ML
INJECTION, EMULSION INTRAVENOUS CONTINUOUS PRN
Status: DISCONTINUED | OUTPATIENT
Start: 2020-02-06 | End: 2020-02-06

## 2020-02-06 RX ORDER — BACITRACIN ZINC 500 [USP'U]/G
OINTMENT TOPICAL 2 TIMES DAILY
Qty: 28 G | Refills: 0 | Status: SHIPPED | OUTPATIENT
Start: 2020-02-06

## 2020-02-06 RX ORDER — ONDANSETRON 4 MG/1
4 TABLET, ORALLY DISINTEGRATING ORAL EVERY 30 MIN PRN
Status: DISCONTINUED | OUTPATIENT
Start: 2020-02-06 | End: 2020-02-07 | Stop reason: HOSPADM

## 2020-02-06 RX ORDER — LIDOCAINE HYDROCHLORIDE AND EPINEPHRINE 10; 10 MG/ML; UG/ML
INJECTION, SOLUTION INFILTRATION; PERINEURAL PRN
Status: DISCONTINUED | OUTPATIENT
Start: 2020-02-06 | End: 2020-02-06 | Stop reason: HOSPADM

## 2020-02-06 RX ORDER — ACETAMINOPHEN 325 MG/1
975 TABLET ORAL ONCE
Status: COMPLETED | OUTPATIENT
Start: 2020-02-06 | End: 2020-02-06

## 2020-02-06 RX ORDER — NALOXONE HYDROCHLORIDE 0.4 MG/ML
.1-.4 INJECTION, SOLUTION INTRAMUSCULAR; INTRAVENOUS; SUBCUTANEOUS
Status: DISCONTINUED | OUTPATIENT
Start: 2020-02-06 | End: 2020-02-07 | Stop reason: HOSPADM

## 2020-02-06 RX ORDER — FENTANYL CITRATE 50 UG/ML
INJECTION, SOLUTION INTRAMUSCULAR; INTRAVENOUS PRN
Status: DISCONTINUED | OUTPATIENT
Start: 2020-02-06 | End: 2020-02-06

## 2020-02-06 RX ORDER — FENTANYL CITRATE 50 UG/ML
25-50 INJECTION, SOLUTION INTRAMUSCULAR; INTRAVENOUS
Status: DISCONTINUED | OUTPATIENT
Start: 2020-02-06 | End: 2020-02-06 | Stop reason: HOSPADM

## 2020-02-06 RX ORDER — DEXAMETHASONE SODIUM PHOSPHATE 4 MG/ML
INJECTION, SOLUTION INTRA-ARTICULAR; INTRALESIONAL; INTRAMUSCULAR; INTRAVENOUS; SOFT TISSUE PRN
Status: DISCONTINUED | OUTPATIENT
Start: 2020-02-06 | End: 2020-02-06

## 2020-02-06 RX ORDER — LIDOCAINE HYDROCHLORIDE 20 MG/ML
INJECTION, SOLUTION INFILTRATION; PERINEURAL PRN
Status: DISCONTINUED | OUTPATIENT
Start: 2020-02-06 | End: 2020-02-06

## 2020-02-06 RX ORDER — GABAPENTIN 300 MG/1
300 CAPSULE ORAL ONCE
Status: COMPLETED | OUTPATIENT
Start: 2020-02-06 | End: 2020-02-06

## 2020-02-06 RX ORDER — LIDOCAINE 40 MG/G
CREAM TOPICAL
Status: DISCONTINUED | OUTPATIENT
Start: 2020-02-06 | End: 2020-02-06 | Stop reason: HOSPADM

## 2020-02-06 RX ORDER — CEFAZOLIN SODIUM 2 G/50ML
2 SOLUTION INTRAVENOUS
Status: COMPLETED | OUTPATIENT
Start: 2020-02-06 | End: 2020-02-06

## 2020-02-06 RX ORDER — ONDANSETRON 2 MG/ML
4 INJECTION INTRAMUSCULAR; INTRAVENOUS EVERY 30 MIN PRN
Status: DISCONTINUED | OUTPATIENT
Start: 2020-02-06 | End: 2020-02-07 | Stop reason: HOSPADM

## 2020-02-06 RX ORDER — PROPOFOL 10 MG/ML
INJECTION, EMULSION INTRAVENOUS PRN
Status: DISCONTINUED | OUTPATIENT
Start: 2020-02-06 | End: 2020-02-06

## 2020-02-06 RX ORDER — SODIUM CHLORIDE, SODIUM LACTATE, POTASSIUM CHLORIDE, CALCIUM CHLORIDE 600; 310; 30; 20 MG/100ML; MG/100ML; MG/100ML; MG/100ML
INJECTION, SOLUTION INTRAVENOUS CONTINUOUS
Status: DISCONTINUED | OUTPATIENT
Start: 2020-02-06 | End: 2020-02-07 | Stop reason: HOSPADM

## 2020-02-06 RX ORDER — MINERAL OIL/HYDROPHIL PETROLAT
OINTMENT (GRAM) TOPICAL 2 TIMES DAILY
Qty: 50 G | Refills: 0 | Status: SHIPPED | OUTPATIENT
Start: 2020-02-06

## 2020-02-06 RX ADMIN — FENTANYL CITRATE 50 MCG: 50 INJECTION, SOLUTION INTRAMUSCULAR; INTRAVENOUS at 14:06

## 2020-02-06 RX ADMIN — ONDANSETRON 4 MG: 2 INJECTION INTRAMUSCULAR; INTRAVENOUS at 14:14

## 2020-02-06 RX ADMIN — LIDOCAINE HYDROCHLORIDE 100 MG: 20 INJECTION, SOLUTION INFILTRATION; PERINEURAL at 14:11

## 2020-02-06 RX ADMIN — ACETAMINOPHEN 975 MG: 325 TABLET ORAL at 13:07

## 2020-02-06 RX ADMIN — DEXAMETHASONE SODIUM PHOSPHATE 4 MG: 4 INJECTION, SOLUTION INTRA-ARTICULAR; INTRALESIONAL; INTRAMUSCULAR; INTRAVENOUS; SOFT TISSUE at 14:14

## 2020-02-06 RX ADMIN — CEFAZOLIN SODIUM 2 G: 2 SOLUTION INTRAVENOUS at 14:18

## 2020-02-06 RX ADMIN — PROPOFOL 100 MCG/KG/MIN: 10 INJECTION, EMULSION INTRAVENOUS at 14:11

## 2020-02-06 RX ADMIN — GABAPENTIN 300 MG: 300 CAPSULE ORAL at 13:07

## 2020-02-06 RX ADMIN — PROPOFOL 30 MG: 10 INJECTION, EMULSION INTRAVENOUS at 14:14

## 2020-02-06 RX ADMIN — SODIUM CHLORIDE, SODIUM LACTATE, POTASSIUM CHLORIDE, CALCIUM CHLORIDE: 600; 310; 30; 20 INJECTION, SOLUTION INTRAVENOUS at 12:26

## 2020-02-06 ASSESSMENT — MIFFLIN-ST. JEOR: SCORE: 1789.2

## 2020-02-06 NOTE — ANESTHESIA CARE TRANSFER NOTE
Patient: Charlotte Estrada    Procedure(s):  EXCISION, LESION, EXTERNAL EAR    Diagnosis: Keloid scar [L91.0]  Diagnosis Additional Information: No value filed.    Anesthesia Type:   MAC     Note:  Airway :Room Air  Patient transferred to:Phase II  Comments: VSS and WNL, comfortable, no PONV, report to Wilber RNHandoff Report: Identifed the Patient, Identified the Reponsible Provider, Reviewed the pertinent medical history, Discussed the surgical course, Reviewed Intra-OP anesthesia mangement and issues during anesthesia, Set expectations for post-procedure period and Allowed opportunity for questions and acknowledgement of understanding      Vitals: (Last set prior to Anesthesia Care Transfer)    CRNA VITALS  2/6/2020 1428 - 2/6/2020 1528      2/6/2020             Resp Rate (set):  10                Electronically Signed By: YADIRA Tadeo CRNA  February 6, 2020  3:34 PM

## 2020-02-06 NOTE — DISCHARGE INSTRUCTIONS
"[facility]  Home Care Following Anesthesia  For 24 hours after surgery:  1. Get plenty of rest.  A responsible adult must stay with you for at least 24 hours after you leave the surgery center.  2. Do not drive or use heavy equipment.  If you have weakness or tingling, don't drive or use heavy equipment until this feeling goes away.   3. Do not drink alcohol.   4. Avoid strenuous or risky activities.  Ask for help when climbing stairs.  5. You may feel lightheaded.  IF so, sit for a few minutes before standing.  Have someone help you get up.   6. If you have nausea (feel sick to your stomach): Drink only clear liquids such as apple juice, ginger ale, broth or 7-Up.  Rest may also help.  Be sure to drink enough fluids.  Move to a regular diet as you feel able.   7. You may have a slight fever.  Call the doctor if your fever is over 100 F (37.7 C) (taken under the tongue) or lasts longer than 24 hours.  8. You may have a dry mouth, a sore throat, muscle aches or trouble sleeping. These should go away after 24 hours.  9. Do not make important or legal decisions.        Today you received a Marcaine or bupivacaine block to numb the nerves near your surgery site.  This is a block using local anesthetic or \"numbing\" medication injected around the nerves to anesthetize or \"numb\" the area supplied by those nerves.  This block is injected into the muscle layer near your surgical site.  The medication may numb the location where you had surgery for 6-18 hours, but may last up to 24 hours.  If your surgical site is an arm or leg you should be careful with your affected limb, since it is possible to injure your limb without being aware of it due to the numbing.  Until full feeling returns, you should guard against bumping or hitting your limb, and avoid extreme hot or cold temperatures on the skin.  As the block wears off, the feeling will return as a tingling or prickly sensation near your " surgical site.  You will experience more discomfort from your incision as the feeling returns.  You may want to take a pain pill (a narcotic or Tylenol if this was prescribed by your surgeon) when you start to experience mild pain before the pain beccomes more severe.  If your pain medications do not control your pain you should notifiy your surgeon.    Tips for taking pain medications  To get the best pain relief possible, remember these points:    Take pain medications as directed, before pain becomes severe.    Pain medication can upset your stomach: taking it with food may help.    Constipation is a common side effect of pain medication. Drink plenty of  fluids.    Eat foods high in fiber. Take a stool softener if recommended by your doctor or pharmacist.    Do not drink alcohol, drive or operate machinery while taking pain medications.    Ask about other ways to control pain, such as with heat, ice or relaxation.    Tylenol/Acetaminophen Consumption  To help encourage the safe use of acetaminophen, the makers of TYLENOL  have lowered the maximum daily dose for single-ingredient Extra Strength TYLENOL  (acetaminophen) products sold in the [place] from 8 pills per day (4,000 mg) to 6 pills per day (3,000 mg). The dosing interval has also changed from 2 pills every 4-6 hours to 2 pills every 6 hours.    If you feel your pain relief is insufficient, you may take Tylenol/Acetaminophen in addition to your narcotic pain medication.     Be careful not to exceed 3,000 mg of Tylenol/Acetaminophen in a 24 hour period from all sources.    If you are taking extra strength Tylenol/acetaminophen (500 mg), the maximum dose is 6 tablets in 24 hours.    If you are taking regular strength acetaminophen (325 mg), the maximum dose is 9 tablets in 24 hours.    **975 mg Tylenol given at 1:07, can take again at 7:07 PM    Call a doctor for any of the followin. Signs of infection (fever, growing tenderness at the surgery site, a  large amount of drainage or bleeding, severe pain, foul-smelling drainage, redness, swelling).  2. It has been over 8 to 10 hours since surgery and you are still not able to urinate (pass water).  3. Headache for over 24 hours.  Your doctor is:  Dr. Mica Guerra, Otolaryngology: [phone]                  Or dial [phone] and ask for the resident on call for:  ENT Otolaryngology  For emergency care, call the:  [facility]:  [phone] (TTY for hearing impaired: [phone])

## 2020-02-06 NOTE — ANESTHESIA POSTPROCEDURE EVALUATION
Anesthesia POST Procedure Evaluation    Patient: Charlotte Estrada   MRN:     [id number] Gender:   female   Age:    19 year old :      2000        Preoperative Diagnosis: Keloid scar [L91.0]   Procedure(s):  EXCISION, LESION, EXTERNAL EAR   Postop Comments: No value filed.       Anesthesia Type:  Not documented  MAC    Reportable Event: NO     PAIN: Uncomplicated   Sign Out status: Comfortable, Well controlled pain     PONV: No PONV   Sign Out status:  No Nausea or Vomiting     Neuro/Psych: Uneventful perioperative course   Sign Out Status: Preoperative baseline; Age appropriate mentation     Airway/Resp.: Uneventful perioperative course   Sign Out Status: Non labored breathing, age appropriate RR; Resp. Status within EXPECTED Parameters     CV: Uneventful perioperative course   Sign Out status: Appropriate BP and perfusion indices; Appropriate HR/Rhythm     Disposition:   Sign Out in:  PACU  Disposition:  Phase II; Home  Recovery Course: Uneventful  Follow-Up: Not required           Last Anesthesia Record Vitals:  CRNA VITALS  2020 1428 - 2020 1528      2020             Resp Rate (set):  10          Last PACU Vitals:  Vitals Value Taken Time   /67 2020  3:05 PM   Temp 36.3  C (97.3  F) 2020  3:05 PM   Pulse 89 2020  3:05 PM   Resp 16 2020  3:05 PM   SpO2 100 % 2020  3:05 PM   Temp src     NIBP 103/61 2020  2:58 PM   Pulse 98 2020  3:00 PM   SpO2 100 % 2020  3:00 PM   Resp     Temp     Ht Rate 102 2020  3:00 PM   Temp 2           Electronically Signed By: Jules Perez MD, 2020, 4:24 PM

## 2020-02-10 LAB — COPATH REPORT: NORMAL

## 2020-02-10 NOTE — OP NOTE
Procedure Date: 02/06/2020      PREOPERATIVE DIAGNOSIS:  Keloid scar.      POSTOPERATIVE DIAGNOSIS:  Keloid scar.      PROCEDURE:  Excision of left ear lobule keloid scar with local tissue advancement for closure of the defect, kenalog injection     PRIMARY SURGEON:  Mica Guerra MD      ASSISTANT SURGEONS:  Princess Arango MD, Fellow; Cony Galarza MD, Resident      ANESTHESIA:  MAC.      FINDINGS:  A 5 cm left postauricular lobule keloid, leaving a 2 cm cutaneous defect      ESTIMATED BLOOD LOSS:  Minimal.      INDICATIONS FOR PROCEDURE:  Charlotte Estrada is a 19-year-old female with a history of left hypertrophic scars/keloids in bilateral ear lobules related to previous piercings.  The left-sided keloid was larger, and she wished for excision.  The above procedure was recommended, and the patient elected to go forward with the procedure after a detailed explanation of risks, benefits and alternatives.      DESCRIPTION OF PROCEDURE:  The patient was brought to the operating room and placed supine on the operating table.  Monitored anesthesia care was induced and maintained with minimal O2 of nasal cannula.  One-percent lidocaine with 1:100,000 epinephrine was infiltrated into the posterior portion of the left ear lobule.  The patient was prepped and draped in the usual sterile fashion.  A time out was performed identifying the patient and procedure, and all were in agreement.  The keloid lesion was excised from medial to lateral by making the incision at the posterior limit of the keloid and excising along the base of the lesion towards the lateral limit of the lesion.  This portion was excised last, taking care to leave enough skin to accommodate closure.  The wound bed was palpated after removal of the keloid, and there was found to be no further areas with scar.  The skin edges at the wound were undermined sharply using a Stortz scissors and a double-pronged skin hook. This created two flaps of 1.5 cm  that were advanced medially.  The wound was then closed primarily in a layered fashion using 4-0 Monocryl to close the deep layer and 5-0 Prolene to close the skin. A tuberculin needle was used to inject triamcinolone intro the scar, concentration 10 mg/ml a total of 0.2 mls injected.    The wound was then dressed with bacitracin.  This concluded the procedure, and the patient was turned back over to Anesthesia.  The patient awakened uneventfully and was returned to the PACU in stable condition.      Dr. Mica Guerra was present for all portions of the procedure.      DRAINS:  None.      SPECIMENS:  Left ear keloid.      COMPLICATIONS:  None.      Dictated by Cony Galarza MD   Resident        I was present, scrubbed for the entire procedure and performed key aspects. I agree with the note.     MICA GUERRA MD       As dictated by CONY GALARZA MD            D: 02/10/2020   T: 02/10/2020   MT: mary      Name:     ALEJANDRA NICOLE   MRN:      [id number]        Account:        [id number]   :      2000           Procedure Date: 2020      Document: [id number]

## 2020-02-10 NOTE — BRIEF OP NOTE
[facility]    Brief Operative Note    Pre-operative diagnosis: Keloid scar [L91.0]  Post-operative diagnosis Same as pre-operative diagnosis    Procedure: Procedure(s):  EXCISION, LESION, EXTERNAL EAR  Surgeon: Surgeon(s) and Role:     * Mica Guerra MD - Primary     * Kandace Dueñas MD - Assisting     * Kamron Wall MD - Resident - Assisting  Anesthesia: MAC with Block   Estimated blood loss: Minimal  Drains: None  Specimens:   ID Type Source Tests Collected by Time Destination   A : Left ear Keloid Tissue Ear, Left SURGICAL PATHOLOGY EXAM Mica Guerra MD 2/6/2020  2:24 PM      Findings:   2cm left posterior auricular lobule keloid.  Complications: None.  Implants: * No implants in log *

## 2020-02-12 ENCOUNTER — OFFICE VISIT (OUTPATIENT)
Dept: OTOLARYNGOLOGY | Facility: CLINIC | Age: 20
End: 2020-02-12
Payer: COMMERCIAL

## 2020-02-12 VITALS — HEIGHT: 64 IN | WEIGHT: 226 LBS | BODY MASS INDEX: 38.58 KG/M2

## 2020-02-12 DIAGNOSIS — Z98.890 POSTOPERATIVE STATE: Primary | ICD-10-CM

## 2020-02-12 RX ORDER — GINSENG 100 MG
CAPSULE ORAL
COMMUNITY
Start: 2020-02-06

## 2020-02-12 ASSESSMENT — PAIN SCALES - GENERAL: PAINLEVEL: MILD PAIN (3)

## 2020-02-12 ASSESSMENT — MIFFLIN-ST. JEOR: SCORE: 1780.13

## 2020-02-12 NOTE — PROGRESS NOTES
Facial Plastic and Reconstructive Surgery      Charlotte Estrada is doing well.  Sutures were removed without complications.  There was nice skin approximation and healing.  Ointment was placed over the incision.

## 2020-02-12 NOTE — NURSING NOTE
"Chief Complaint   Patient presents with     RECHECK     1 week follow up     Height 1.626 m (5' 4\"), weight 102.5 kg (226 lb), last menstrual period 01/24/2020.    Jaelyn Aguirre, EMT  "

## 2020-02-12 NOTE — LETTER
2/12/2020       RE: Charlotte Estrada  [address]  [place] [address]     Dear Colleague,    Thank you for referring your patient, Charlotte Estrada, to the [facility] E[facility] at [facility]. Please see a copy of my visit note below.    Facial Plastic and Reconstructive Surgery      Charlotte Estrada is doing well.  Sutures were removed without complications.  There was nice skin approximation and healing.  Ointment was placed over the incision.          Again, thank you for allowing me to participate in the care of your patient.      Sincerely,    Mica Guerra MD

## 2020-04-13 ENCOUNTER — TELEPHONE (OUTPATIENT)
Dept: OTOLARYNGOLOGY | Facility: CLINIC | Age: 20
End: 2020-04-13

## 2020-04-13 NOTE — TELEPHONE ENCOUNTER
Spoke with patient regarding virtual visit with Dr. Guerra on 4/15/20. Confirmed patient's email address. Also verified patient's appointment time, and informed him/her that we would be calling again 10-15 minutes prior to the appointment. Patient expressed understanding. Will send [employer] message with virtual visit information.    Patient email: [email or web address]  Appointment date: 4/15/20  Appointment time: 10:40am  [employer] active? NO, Per patient-will activate [employer] tomorrow.    Jaelyn Aguirre, EMT

## 2020-04-15 ENCOUNTER — VIRTUAL VISIT (OUTPATIENT)
Dept: OTOLARYNGOLOGY | Facility: CLINIC | Age: 20
End: 2020-04-15
Payer: COMMERCIAL

## 2020-04-15 VITALS — BODY MASS INDEX: 38.79 KG/M2 | HEIGHT: 64 IN

## 2020-04-15 DIAGNOSIS — H69.92 DYSFUNCTION OF LEFT EUSTACHIAN TUBE: Primary | ICD-10-CM

## 2020-04-15 RX ORDER — FLUTICASONE PROPIONATE 50 MCG
2 SPRAY, SUSPENSION (ML) NASAL DAILY
Qty: 16 G | Refills: 1 | Status: SHIPPED | OUTPATIENT
Start: 2020-04-15 | End: 2020-05-15

## 2020-04-15 ASSESSMENT — PAIN SCALES - GENERAL: PAINLEVEL: NO PAIN (0)

## 2020-04-15 NOTE — PROGRESS NOTES
"Charlotte Estrada is a 19 year old female who is being evaluated via a billable video visit.      The patient has been notified of following:     \"This video visit will be conducted via a call between you and your physician/provider. We have found that certain health care needs can be provided without the need for an in-person physical exam.  This service lets us provide the care you need with a video conversation.  If a prescription is necessary we can send it directly to your pharmacy.  If lab work is needed we can place an order for that and you can then stop by our lab to have the test done at a later time.    Video visits are billed at different rates depending on your insurance coverage.  Please reach out to your insurance provider with any questions.    If during the course of the call the physician/provider feels a video visit is not appropriate, you will not be charged for this service.\"    Patient has given verbal consent for Video visit? Yes    How would you like to obtain your AVS? Jojo    Patient would like the video invitation sent by: Send to e-mail at: [email or web address]    Video Start Time: 1045 am    Additional provider notes:     Facial Plastic and Reconstructive Surgery      Charlotte Estrada is status post excision of a left auricular keloid.  She has been doing quite well.  She is placing ointment over the area.  She does not note any recurrence of the keloid or any significant scarring in the area.  She is quite pleased.    On the video visit today I was able to examine the earlobe visually.  This looks good well-healed with no significant scarring.  I do not see any evidence of recurrent hypertrophic scar formation.    She does note that she has some fullness in the left ear.  She feels like she cannot clear it.  This is obstructing her hearing a little bit.  I am limited in examining her tympanic membrane over video visit but I will place her on Flonase to help open up the eustachian tube to help " drain where this to be any middle ear effusion.    She is to call me if she has any concerns before her follow-up appointment in 3 months.          Video-Visit Details    Type of service:  Video Visit    Video End Time (time video stopped): 1059 am    Originating Location (pt. Location): Home    Distant Location (provider location):  [facility]     Mode of Communication:  Video Conference via [facility]      Mica Guerra MD

## 2021-01-03 ENCOUNTER — HEALTH MAINTENANCE LETTER (OUTPATIENT)
Age: 21
End: 2021-01-03

## 2021-10-09 ENCOUNTER — HEALTH MAINTENANCE LETTER (OUTPATIENT)
Age: 21
End: 2021-10-09

## 2022-01-29 ENCOUNTER — HEALTH MAINTENANCE LETTER (OUTPATIENT)
Age: 22
End: 2022-01-29

## 2022-09-17 ENCOUNTER — HEALTH MAINTENANCE LETTER (OUTPATIENT)
Age: 22
End: 2022-09-17

## 2023-02-17 NOTE — TELEPHONE ENCOUNTER
Patient was scheduled at the request of Dr. Rivera Spence when patient was seen in clinic. Patient was given surgery date of 02/06/2020. DAPHNE Mayer completed surgical teaching and patient was given surgery packet and soap.     Patient understands that he/she will need a pre-op history and physical completed within 30 days of surgery. If unable to be completed by primary care clinic, patient will call to schedule PAC appointment at the clinic and surgery center. Patient expresses she does not have PCP and will see PAC clinic on 1/24/2020      Post op appointment was scheduled.     Procedure was forwarded to Vandana Dorado and Darling Wade for prior authorization on 1/22/2020.        Spontaneous, unlabored and symmetrical

## 2023-05-06 ENCOUNTER — HEALTH MAINTENANCE LETTER (OUTPATIENT)
Age: 23
End: 2023-05-06

## (undated) DEVICE — SYR 10ML FINGER CONTROL W/O NDL 309695

## (undated) DEVICE — SOL WATER IRRIG 500ML BOTTLE 2F7113

## (undated) DEVICE — GLOVE PROTEXIS MICRO 7.0  2D73PM70

## (undated) DEVICE — GLOVE PROTEXIS BLUE W/NEU-THERA 7.5  2D73EB75

## (undated) DEVICE — ESU NDL COLORADO MICRO 3CM STR N103A

## (undated) DEVICE — EYE PREP BETADINE 5% SOLUTION 30ML 0065-0411-30

## (undated) DEVICE — NDL 25GA 1.5" 305127

## (undated) DEVICE — PACK ENT MINOR CUSTOM ASC

## (undated) DEVICE — PREP SKIN SCRUB TRAY 4461A

## (undated) DEVICE — SOL NACL 0.9% IRRIG 500ML BOTTLE 2F7123

## (undated) DEVICE — LINEN TOWEL PACK X5 5464

## (undated) RX ORDER — ONDANSETRON 2 MG/ML
INJECTION INTRAMUSCULAR; INTRAVENOUS
Status: DISPENSED
Start: 2020-02-06

## (undated) RX ORDER — DEXAMETHASONE SODIUM PHOSPHATE 4 MG/ML
INJECTION, SOLUTION INTRA-ARTICULAR; INTRALESIONAL; INTRAMUSCULAR; INTRAVENOUS; SOFT TISSUE
Status: DISPENSED
Start: 2020-02-06

## (undated) RX ORDER — PROPOFOL 10 MG/ML
INJECTION, EMULSION INTRAVENOUS
Status: DISPENSED
Start: 2020-02-06

## (undated) RX ORDER — GABAPENTIN 300 MG/1
CAPSULE ORAL
Status: DISPENSED
Start: 2020-02-06

## (undated) RX ORDER — CEFAZOLIN SODIUM 1 G/3ML
INJECTION, POWDER, FOR SOLUTION INTRAMUSCULAR; INTRAVENOUS
Status: DISPENSED
Start: 2020-02-06

## (undated) RX ORDER — LIDOCAINE HYDROCHLORIDE 10 MG/ML
INJECTION, SOLUTION EPIDURAL; INFILTRATION; INTRACAUDAL; PERINEURAL
Status: DISPENSED
Start: 2020-02-06

## (undated) RX ORDER — TRIAMCINOLONE ACETONIDE 40 MG/ML
INJECTION, SUSPENSION INTRA-ARTICULAR; INTRAMUSCULAR
Status: DISPENSED
Start: 2020-02-06

## (undated) RX ORDER — LIDOCAINE HYDROCHLORIDE 20 MG/ML
INJECTION, SOLUTION EPIDURAL; INFILTRATION; INTRACAUDAL; PERINEURAL
Status: DISPENSED
Start: 2020-02-06

## (undated) RX ORDER — FENTANYL CITRATE 50 UG/ML
INJECTION, SOLUTION INTRAMUSCULAR; INTRAVENOUS
Status: DISPENSED
Start: 2020-02-06

## (undated) RX ORDER — TRIAMCINOLONE ACETONIDE 40 MG/ML
INJECTION, SUSPENSION INTRA-ARTICULAR; INTRAMUSCULAR
Status: DISPENSED
Start: 2019-12-10

## (undated) RX ORDER — ACETAMINOPHEN 325 MG/1
TABLET ORAL
Status: DISPENSED
Start: 2020-02-06